# Patient Record
(demographics unavailable — no encounter records)

---

## 2025-06-18 NOTE — CARDIOLOGY SUMMARY
[Normal] : normal [___] : [unfilled] [de-identified] : Echo on 7/3/17 showed normal LV function with mild AS (1.7 cm2).

## 2025-06-18 NOTE — REASON FOR VISIT
[FreeTextEntry1] : 84 year-old male with mod AS (1.3 cm2), HTN, HLD, hyperglycemia, presents for followup.    Patient was last seen on 6/21/24 - Please refer to NP note below. Pt is here for follow up. Pt has been stable. Patient denies CP, SOB, palpitations, or lightheadedness. Patient denies h/o syncope. Pt goes to [facility] gym for exercise daily for 1-1.5 hour.  Pt is on Amlodipine 5 mg, Hydralazine 25mg, Metoprolol ER 25 mg, Losartan 100 mg for HTN. He is on Atorvastatin 20 mg and Vascepa for HLD.  Today's /50 P 72.  Patient has trace edema. I advised patient to undergo an echocardiogram.  Patient underwent an echocardiogram and it showed normal LV function with moderate aortic stenosis (1.2 cm).  He is on Amlodipine 5 mg, Metoprolol ER 25 mg, Losartan 100 mg for HTN.  He is on Atorvastatin 20 mg for HLD.    Patient underwent an echocardiogram 6/16/23 and it showed normal LV function with moderate aortic stenosis (1.1 cm2).  I advised patient to start HCTZ 12.5 mg for elevated BP.  Patient underwent an echocardiogram 6/10/22 and it showed normal LV function with moderate aortic stenosis (1.3 cm2).   Patient underwent an echocardiogram 1/26/21 and it showed normal LV function with moderate AS (1.3 cm2).

## 2025-06-18 NOTE — HISTORY OF PRESENT ILLNESS
[FreeTextEntry1] : 6/21/24 - Please refer to NP note below. Pt is here for follow up. Pt has been stable. Patient denies CP, SOB, palpitations, or lightheadedness. Patient denies h/o syncope. Pt goes to [facility] gym for exercise daily for 1-1.5 hour. Today's /50 P 72. Pt is on Amlodipine 5 mg, Hydralazine 25mg, Metoprolol ER 25 mg, Losartan 100 mg for HTN. He is on Atorvastatin 20 mg and Vascepa for HLD.  Patient has trace edema. I advised patient to undergo an echocardiogram.   6/16/23 - Patient has been stable.  Patient denies CP, SOB, palpitations, or lightheadedness.  Patient reports elevated -150 at home.  PCP increased Amlodipine to 10 mg but he had increased LE edema.  He is back down to 5 mg.  Patient has trace LE edema.   I advised patient to undergo an echocardiogram. Patient underwent an echocardiogram and it showed normal LV function with moderate aortic stenosis (1.1 cm2).   I advised patient to start HCTZ 12.5 mg for elevated BP.    6/10/22 - Patient reports feeling OK. Patient denies CP.  Patient reports mild ESPINOZA.  Patient denies palpitations.  Patient reports mild lightheadedness.  Patient denies h/o syncope.  ECG was unremarkable.  Diffuse 2/6 LAMINE noted.  Patient has trace LE edema.  I advised patient to undergo an echocardiogram.  Patient underwent an echocardiogram and it showed normal LV function with moderate aortic stenosis (1.3 cm2).   1/26/21 - Patient has been stable.  Patient denies CP.  Patient reports mild ESPINOZA.  Patient denies palpitations.  Patient denies lightheadedness.  Patient denies h/o syncope.  He is on Amlodipine 5 mg, Losartan 100 mg, and Metoprolol ER 25 mg for HTN.  He is on Atorvastatin 20 mg for HLD.  I advised patient to undergo an echocardiogram to assess AS.

## 2025-06-18 NOTE — PHYSICAL EXAM
[General Appearance - Well Developed] : well developed [Normal Appearance] : normal appearance [Well Groomed] : well groomed [General Appearance - Well Nourished] : well nourished [No Deformities] : no deformities [General Appearance - In No Acute Distress] : no acute distress [Normal Conjunctiva] : the conjunctiva exhibited no abnormalities [Eyelids - No Xanthelasma] : the eyelids demonstrated no xanthelasmas [Normal Oral Mucosa] : normal oral mucosa [No Oral Pallor] : no oral pallor [No Oral Cyanosis] : no oral cyanosis [Normal Jugular Venous A Waves Present] : normal jugular venous A waves present [Normal Jugular Venous V Waves Present] : normal jugular venous V waves present [No Jugular Venous Rowland A Waves] : no jugular venous rowland A waves [Respiration, Rhythm And Depth] : normal respiratory rhythm and effort [Exaggerated Use Of Accessory Muscles For Inspiration] : no accessory muscle use [Auscultation Breath Sounds / Voice Sounds] : lungs were clear to auscultation bilaterally [Heart Rate And Rhythm] : heart rate and rhythm were normal [Heart Sounds] : normal S1 and S2 [Arterial Pulses Normal] : the arterial pulses were normal [Abdomen Soft] : soft [Abdomen Tenderness] : non-tender [Abdomen Mass (___ Cm)] : no abdominal mass palpated [Abnormal Walk] : normal gait [Gait - Sufficient For Exercise Testing] : the gait was sufficient for exercise testing [Nail Clubbing] : no clubbing of the fingernails [Cyanosis, Localized] : no localized cyanosis [Petechial Hemorrhages (___cm)] : no petechial hemorrhages [] : no ischemic changes [Oriented To Time, Place, And Person] : oriented to person, place, and time [Affect] : the affect was normal [Mood] : the mood was normal [No Anxiety] : not feeling anxious [FreeTextEntry1] : 2/6 LAMINE at apex and 1/6 LAMINE at LLSB and RUSB; Patient has trace LE edema.